# Patient Record
Sex: FEMALE | Race: OTHER | HISPANIC OR LATINO | URBAN - METROPOLITAN AREA
[De-identification: names, ages, dates, MRNs, and addresses within clinical notes are randomized per-mention and may not be internally consistent; named-entity substitution may affect disease eponyms.]

---

## 2017-01-26 ENCOUNTER — EMERGENCY (EMERGENCY)
Facility: HOSPITAL | Age: 27
LOS: 1 days | Discharge: HOME | End: 2017-01-26
Attending: EMERGENCY MEDICINE | Admitting: EMERGENCY MEDICINE
Payer: COMMERCIAL

## 2017-01-26 VITALS
WEIGHT: 162.04 LBS | HEIGHT: 61 IN | OXYGEN SATURATION: 97 % | DIASTOLIC BLOOD PRESSURE: 85 MMHG | TEMPERATURE: 99 F | RESPIRATION RATE: 18 BRPM | HEART RATE: 93 BPM | SYSTOLIC BLOOD PRESSURE: 138 MMHG

## 2017-01-26 VITALS
DIASTOLIC BLOOD PRESSURE: 78 MMHG | RESPIRATION RATE: 18 BRPM | TEMPERATURE: 99 F | OXYGEN SATURATION: 100 % | SYSTOLIC BLOOD PRESSURE: 150 MMHG | HEART RATE: 90 BPM

## 2017-01-26 DIAGNOSIS — N61.1 ABSCESS OF THE BREAST AND NIPPLE: ICD-10-CM

## 2017-01-26 DIAGNOSIS — Z79.2 LONG TERM (CURRENT) USE OF ANTIBIOTICS: ICD-10-CM

## 2017-01-26 DIAGNOSIS — N64.4 MASTODYNIA: ICD-10-CM

## 2017-01-26 DIAGNOSIS — Z79.891 LONG TERM (CURRENT) USE OF OPIATE ANALGESIC: ICD-10-CM

## 2017-01-26 LAB
ALBUMIN SERPL ELPH-MCNC: 3.3 G/DL — LOW (ref 3.4–5)
ALP SERPL-CCNC: 58 U/L — SIGNIFICANT CHANGE UP (ref 40–120)
ALT FLD-CCNC: 16 U/L — SIGNIFICANT CHANGE UP (ref 12–42)
ANION GAP SERPL CALC-SCNC: 7 MMOL/L — LOW (ref 9–16)
AST SERPL-CCNC: 12 U/L — LOW (ref 15–37)
BASOPHILS NFR BLD AUTO: 0.2 % — SIGNIFICANT CHANGE UP (ref 0–2)
BILIRUB SERPL-MCNC: 0.4 MG/DL — SIGNIFICANT CHANGE UP (ref 0.2–1.2)
BUN SERPL-MCNC: 8 MG/DL — SIGNIFICANT CHANGE UP (ref 7–23)
CALCIUM SERPL-MCNC: 8.7 MG/DL — SIGNIFICANT CHANGE UP (ref 8.5–10.5)
CHLORIDE SERPL-SCNC: 105 MMOL/L — SIGNIFICANT CHANGE UP (ref 96–108)
CO2 SERPL-SCNC: 26 MMOL/L — SIGNIFICANT CHANGE UP (ref 22–31)
CREAT SERPL-MCNC: 0.49 MG/DL — LOW (ref 0.5–1.3)
EOSINOPHIL NFR BLD AUTO: 0.5 % — SIGNIFICANT CHANGE UP (ref 0–6)
GLUCOSE SERPL-MCNC: 86 MG/DL — SIGNIFICANT CHANGE UP (ref 70–99)
HCT VFR BLD CALC: 36.4 % — SIGNIFICANT CHANGE UP (ref 34.5–45)
HGB BLD-MCNC: 12.1 G/DL — SIGNIFICANT CHANGE UP (ref 11.5–15.5)
LYMPHOCYTES # BLD AUTO: 8.3 % — LOW (ref 13–44)
MCHC RBC-ENTMCNC: 32.2 PG — SIGNIFICANT CHANGE UP (ref 27–34)
MCHC RBC-ENTMCNC: 33.2 G/DL — SIGNIFICANT CHANGE UP (ref 32–36)
MCV RBC AUTO: 96.8 FL — SIGNIFICANT CHANGE UP (ref 80–100)
MONOCYTES NFR BLD AUTO: 5 % — SIGNIFICANT CHANGE UP (ref 2–14)
NEUTROPHILS NFR BLD AUTO: 86 % — HIGH (ref 43–77)
PLATELET # BLD AUTO: 245 K/UL — SIGNIFICANT CHANGE UP (ref 150–400)
POTASSIUM SERPL-MCNC: 4.4 MMOL/L — SIGNIFICANT CHANGE UP (ref 3.5–5.3)
POTASSIUM SERPL-SCNC: 4.4 MMOL/L — SIGNIFICANT CHANGE UP (ref 3.5–5.3)
PROT SERPL-MCNC: 7.2 G/DL — SIGNIFICANT CHANGE UP (ref 6.4–8.2)
RBC # BLD: 3.76 M/UL — LOW (ref 3.8–5.2)
RBC # FLD: 12.5 % — SIGNIFICANT CHANGE UP (ref 10.3–16.9)
SODIUM SERPL-SCNC: 138 MMOL/L — SIGNIFICANT CHANGE UP (ref 135–145)
WBC # BLD: 12.8 K/UL — HIGH (ref 3.8–10.5)
WBC # FLD AUTO: 12.8 K/UL — HIGH (ref 3.8–10.5)

## 2017-01-26 PROCEDURE — 99284 EMERGENCY DEPT VISIT MOD MDM: CPT | Mod: 25

## 2017-01-26 PROCEDURE — 87040 BLOOD CULTURE FOR BACTERIA: CPT

## 2017-01-26 PROCEDURE — 87075 CULTR BACTERIA EXCEPT BLOOD: CPT

## 2017-01-26 PROCEDURE — 85025 COMPLETE CBC W/AUTO DIFF WBC: CPT

## 2017-01-26 PROCEDURE — 76942 ECHO GUIDE FOR BIOPSY: CPT

## 2017-01-26 PROCEDURE — 96374 THER/PROPH/DIAG INJ IV PUSH: CPT

## 2017-01-26 PROCEDURE — 76942 ECHO GUIDE FOR BIOPSY: CPT | Mod: 26,59

## 2017-01-26 PROCEDURE — 10022: CPT

## 2017-01-26 PROCEDURE — 87205 SMEAR GRAM STAIN: CPT

## 2017-01-26 PROCEDURE — 76641 ULTRASOUND BREAST COMPLETE: CPT

## 2017-01-26 PROCEDURE — 80053 COMPREHEN METABOLIC PANEL: CPT

## 2017-01-26 PROCEDURE — 96375 TX/PRO/DX INJ NEW DRUG ADDON: CPT

## 2017-01-26 PROCEDURE — 76642 ULTRASOUND BREAST LIMITED: CPT | Mod: 26,RT

## 2017-01-26 PROCEDURE — 99285 EMERGENCY DEPT VISIT HI MDM: CPT

## 2017-01-26 PROCEDURE — 87070 CULTURE OTHR SPECIMN AEROBIC: CPT

## 2017-01-26 RX ORDER — MORPHINE SULFATE 50 MG/1
4 CAPSULE, EXTENDED RELEASE ORAL ONCE
Qty: 0 | Refills: 0 | Status: DISCONTINUED | OUTPATIENT
Start: 2017-01-26 | End: 2017-01-26

## 2017-01-26 RX ADMIN — MORPHINE SULFATE 4 MILLIGRAM(S): 50 CAPSULE, EXTENDED RELEASE ORAL at 11:43

## 2017-01-26 RX ADMIN — MORPHINE SULFATE 4 MILLIGRAM(S): 50 CAPSULE, EXTENDED RELEASE ORAL at 12:00

## 2017-01-26 RX ADMIN — Medication 100 MILLIGRAM(S): at 13:01

## 2017-01-26 NOTE — CONSULT NOTE ADULT - SUBJECTIVE AND OBJECTIVE BOX
HPI: 25 yo female smoker with recurrent abscesses on breasts and groins who presents with recurrent right breast abscess. The patient reports she had an I&D at the same location back in August, but was never able to find time to follow up with Dr. Sparks. The patient reports the redness began 4 days ago and 2 days ago it became more fluctuant. She denies fevers or chills. No drainage.       PAST MEDICAL & SURGICAL HISTORY:  No pertinent past medical history  No significant past surgical history        Allergies    No Known Allergies    Intolerances        SOCIAL HISTORY:    FAMILY HISTORY:      Vital Signs Last 24 Hrs  T(C): 37.3, Max: 37.3 (01-26 @ 15:38)  T(F): 99.2, Max: 99.2 (01-26 @ 15:38)  HR: 90 (79 - 93)  BP: 150/78 (117/75 - 150/78)  BP(mean): --  RR: 18 (18 - 18)  SpO2: 100% (97% - 100%)    PHYSICAL EXAM:  General: Patient in bed in mild distress 2/2 right breast discomfort  abd: Soft, non-distended, non-tender  left breast: No erythema, no tenderness, no discharge  Right breast: 6 oclock breast fluctuant area with erythema and tenderness, needle aspiration site,         LABS:                        12.1   12.8  )-----------( 245      ( 26 Jan 2017 11:52 )             36.4     26 Jan 2017 11:52    138    |  105    |  8      ----------------------------<  86     4.4     |  26     |  0.49     Ca    8.7        26 Jan 2017 11:52    TPro  7.2    /  Alb  3.3    /  TBili  0.4    /  DBili  x      /  AST  12     /  ALT  16     /  AlkPhos  58     26 Jan 2017 11:52          RADIOLOGY & ADDITIONAL STUDIES:

## 2017-01-26 NOTE — ED ADULT NURSE NOTE - OBJECTIVE STATEMENT
Patient presents to ED with c/o abscess to right breast x4 days. Denies any fever or chills. Unable to assess abscess at present time, patient reports "it hurts too much to lift it." Requesting to wait until seen by MD/PA. No s/s acute distress noted. Will continue to monitor.

## 2017-01-26 NOTE — ED PROVIDER NOTE - MEDICAL DECISION MAKING DETAILS
27 yo female with right breast abscess. h/o similar in 08/2016, had breast US, FNA and surgical I&D done at that time. pending labs and US, surgical consult.

## 2017-01-26 NOTE — ED PROVIDER NOTE - SKIN, MLM
right breast localized erythema+, edema+, tenderness++, increased warmth to touch right breast  inner lower quadrant localized erythema+, edema+, tenderness++, increased warmth to touch

## 2017-01-26 NOTE — CONSULT NOTE ADULT - ASSESSMENT
25 yo female with Right breast abscess. Discussed with patient that it would need to be drained further and the possibility of needing IV abx, however, patient decided to leave AMA because of  needs. Discussed the importance of prompt follow up and recommended to stay and allow I&D however patient refused.

## 2017-01-26 NOTE — ED ADULT NURSE NOTE - EXPLANATION OF PATIENT'S REASON FOR LEAVING
patient states "I need to go  my son." Patient educated to return to ED tomorrow for IV antibiotics and drainage. Educated to return to ED sooner if symptoms worsen or develops high fevers

## 2017-01-26 NOTE — ED PROVIDER NOTE - OBJECTIVE STATEMENT
27 yo female s/p elective TOP yesterday in the Er with right breast pain, swelling. Pt reports that about 4 days ago she noted that her breast is painful. pt had right breast abscess drainage done 6 month ago. She is concerned that its similar abscess as its in the same location,  and its increased in size and became very painful over the past 4 days. Denies fever, chills, denies any discharge from the nipples. 27 yo female s/p elective TOP yesterday in the Er with right breast pain, swelling. Pt reports that about 4 days ago she noted that her breast is painful. pt had right breast abscess drainage done 6 month ago. She is concerned that its similar abscess as its in the same location,  and its increased in size and became very painful over the past 4 days. Denies fever, chills, denies any discharge from her nipples.

## 2017-01-26 NOTE — ED PROVIDER NOTE - ATTENDING CONTRIBUTION TO CARE
right breast abscess/cellulitis since yesterday. no fever.  area red/warm/tender.  started antibiotic

## 2017-01-26 NOTE — ED PROVIDER NOTE - PROGRESS NOTE DETAILS
pt had breast US and FNA done, findings consistent with abscess. Pt seen by surgical resident on call. admission for IV abx recommended. Refused as she has 3 yo child  to take care off. Pt will sign AMA . reports that she understands the importance of proper  treatment and will be back tomorrow to be admitted.

## 2017-01-31 LAB
CULTURE RESULTS: SIGNIFICANT CHANGE UP
CULTURE RESULTS: SIGNIFICANT CHANGE UP
SPECIMEN SOURCE: SIGNIFICANT CHANGE UP
SPECIMEN SOURCE: SIGNIFICANT CHANGE UP

## 2017-03-29 ENCOUNTER — APPOINTMENT (OUTPATIENT)
Dept: INTERNAL MEDICINE | Facility: CLINIC | Age: 27
End: 2017-03-29

## 2019-10-11 ENCOUNTER — EMERGENCY (EMERGENCY)
Facility: HOSPITAL | Age: 29
LOS: 1 days | Discharge: HOME | End: 2019-10-11
Admitting: EMERGENCY MEDICINE
Payer: MEDICAID

## 2019-10-11 VITALS
RESPIRATION RATE: 16 BRPM | SYSTOLIC BLOOD PRESSURE: 114 MMHG | WEIGHT: 166.89 LBS | HEIGHT: 61 IN | TEMPERATURE: 99 F | OXYGEN SATURATION: 98 % | DIASTOLIC BLOOD PRESSURE: 73 MMHG | HEART RATE: 85 BPM

## 2019-10-11 DIAGNOSIS — N76.4 ABSCESS OF VULVA: ICD-10-CM

## 2019-10-11 PROCEDURE — 56405 I&D VULVA/PERINEAL ABSCESS: CPT

## 2019-10-11 PROCEDURE — 99283 EMERGENCY DEPT VISIT LOW MDM: CPT | Mod: 25

## 2019-10-11 RX ORDER — IBUPROFEN 200 MG
600 TABLET ORAL ONCE
Refills: 0 | Status: COMPLETED | OUTPATIENT
Start: 2019-10-11 | End: 2019-10-11

## 2019-10-11 RX ORDER — ACETAMINOPHEN 500 MG
650 TABLET ORAL ONCE
Refills: 0 | Status: COMPLETED | OUTPATIENT
Start: 2019-10-11 | End: 2019-10-11

## 2019-10-11 RX ORDER — IBUPROFEN 200 MG
1 TABLET ORAL
Qty: 9 | Refills: 0
Start: 2019-10-11 | End: 2019-10-13

## 2019-10-11 RX ADMIN — Medication 650 MILLIGRAM(S): at 12:48

## 2019-10-11 RX ADMIN — Medication 1 TABLET(S): at 12:49

## 2019-10-11 RX ADMIN — Medication 600 MILLIGRAM(S): at 12:49

## 2019-10-11 NOTE — ED PROVIDER NOTE - GENITOURINARY, MLM
R sided labial/groin 2 cm fluctuant abscess with induration, swelling, tenderness, and mild erythema, no lymphangitis.

## 2019-10-11 NOTE — ED PROVIDER NOTE - NSFOLLOWUPINSTRUCTIONS_ED_ALL_ED_FT
Avoid getting it wet for two days. Return to ED in two days for wound check             ABSCESS - AfterCare(R) Instructions(ER/ED)     Abscess    WHAT YOU NEED TO KNOW:    A warm compress may help your abscess drain. Your healthcare provider may make a cut in the abscess so it can drain. You may need surgery to remove an abscess that is on your hands or buttocks.     DISCHARGE INSTRUCTIONS:    Return to the emergency department if:     The area around your abscess becomes very painful, warm, or has red streaks.      You have a fever and chills.      Your heart is beating faster than usual.       You feel faint or confused.    Contact your healthcare provider if:     Your abscess gets bigger or does not get better.      Your abscess returns.      You have questions or concerns about your condition or care.    Medicines: You may need any of the following:     Antibiotics help treat a bacterial infection.       Acetaminophen decreases pain and fever. It is available without a doctor's order. Ask how much to take and how often to take it. Follow directions. Acetaminophen can cause liver damage if not taken correctly.      NSAIDs, such as ibuprofen, help decrease swelling, pain, and fever. This medicine is available with or without a doctor's order. NSAIDs can cause stomach bleeding or kidney problems in certain people. If you take blood thinner medicine, always ask your healthcare provider if NSAIDs are safe for you. Always read the medicine label and follow directions.      Take your medicine as directed. Contact your healthcare provider if you think your medicine is not helping or if you have side effects. Tell him or her if you are allergic to any medicine. Keep a list of the medicines, vitamins, and herbs you take. Include the amounts, and when and why you take them. Bring the list or the pill bottles to follow-up visits. Carry your medicine list with you in case of an emergency.    Self-care:     Apply a warm compress to your abscess. This will help it open and drain. Wet a washcloth in warm, but not hot, water. Apply the compress for 10 minutes. Repeat this 4 times each day. Do not press on an abscess or try to open it with a needle. You may push the bacteria deeper or into your blood.       Do not share your clothes, towels, or sheets with anyone. This can spread the infection to others.       Wash your hands often. This can help prevent the spread of germs. Use soap and water or an alcohol-based hand rub.     Care for your wound after it is drained:     Care for your wound as directed. If your healthcare provider says it is okay, carefully remove the bandage and gauze packing. You may need to soak the gauze to get it out of your wound. Clean your wound and the area around it as directed. Dry the area and put on new, clean bandages. Change your bandages when they get wet or dirty.       Ask your healthcare provider how to change the gauze in your wound. Keep track of how many pieces of gauze are placed inside the wound. Do not put too much packing in the wound. Do not pack the gauze too tightly in your wound.     Follow up with your healthcare provider in 1 to 3 days: You may need to have your packing removed or your bandage changed. Write down your questions so you remember to ask them during your visits.        © Copyright [employer] 2019 All illustrations and images included in CareNotes are the copyrighted property of [email or web address] or [employer].      back to top                      © Copyright [employer] 2019

## 2019-10-11 NOTE — ED PROVIDER NOTE - PATIENT PORTAL LINK FT
You can access the FollowMyHealth Patient Portal offered by [facility] by registering at the following website: [email or web address]. By joining [employer]’s FollowMyHealth portal, you will also be able to view your health information using other applications (apps) compatible with our system.

## 2019-10-11 NOTE — ED ADULT NURSE NOTE - OBJECTIVE STATEMENT
pt has an abscess lateral to right groin , started about 2 days ago ,getting worse , more painful ,abscess with surrounding redness, denies fever, slight chills

## 2019-10-11 NOTE — ED PROVIDER NOTE - CLINICAL SUMMARY MEDICAL DECISION MAKING FREE TEXT BOX
Patient with right labial abscess was I&D. No complications. Recommend abx therapy and wound check in 2 days.

## 2019-10-11 NOTE — ED PROVIDER NOTE - OBJECTIVE STATEMENT
30 y/o F with PMHx oral substnce dependency, now in remission, presents to the ED complaining of R labial/groin abscess for the last 2 days, progressively with worsening swelling and tenderness. Pt admits abscess began as infected hair follicle from shaving. Denies fever, drainage, abdominal pain or numbness/tingling. 30 y/o F with PMHx oral substance dependency, now in remission, presents to the ED complaining of R labial/groin abscess for the last 2 days, progressively with worsening swelling and tenderness. Pt admits abscess began as infected hair follicle from shaving. Denies fever, drainage, abdominal pain or numbness/tingling.

## 2021-07-19 ENCOUNTER — APPOINTMENT (OUTPATIENT)
Dept: INTERNAL MEDICINE | Facility: CLINIC | Age: 31
End: 2021-07-19

## 2021-07-22 ENCOUNTER — APPOINTMENT (OUTPATIENT)
Dept: INTERNAL MEDICINE | Facility: CLINIC | Age: 31
End: 2021-07-22

## 2022-05-23 NOTE — ED PROVIDER NOTE - NS HIV RISK FACTOR YES
Declined
You can access the FollowMyHealth Patient Portal offered by [facility] by registering at the following website: [email or web address]. By joining [employer]’s FollowMyHealth portal, you will also be able to view your health information using other applications (apps) compatible with our system.

## 2022-10-27 ENCOUNTER — APPOINTMENT (OUTPATIENT)
Dept: INTERNAL MEDICINE | Facility: CLINIC | Age: 32
End: 2022-10-27

## 2022-10-31 ENCOUNTER — APPOINTMENT (OUTPATIENT)
Dept: INTERNAL MEDICINE | Facility: CLINIC | Age: 32
End: 2022-10-31

## 2022-10-31 ENCOUNTER — RESULT CHARGE (OUTPATIENT)
Age: 32
End: 2022-10-31

## 2022-11-01 ENCOUNTER — APPOINTMENT (OUTPATIENT)
Dept: INTERNAL MEDICINE | Facility: CLINIC | Age: 32
End: 2022-11-01

## 2022-11-01 ENCOUNTER — NON-APPOINTMENT (OUTPATIENT)
Age: 32
End: 2022-11-01

## 2022-11-01 VITALS
WEIGHT: 219 LBS | OXYGEN SATURATION: 97 % | HEART RATE: 92 BPM | SYSTOLIC BLOOD PRESSURE: 112 MMHG | TEMPERATURE: 97.4 F | HEIGHT: 61 IN | DIASTOLIC BLOOD PRESSURE: 66 MMHG | BODY MASS INDEX: 41.35 KG/M2

## 2022-11-01 DIAGNOSIS — M54.9 DORSALGIA, UNSPECIFIED: ICD-10-CM

## 2022-11-01 DIAGNOSIS — F19.10 OTHER PSYCHOACTIVE SUBSTANCE ABUSE, UNCOMPLICATED: ICD-10-CM

## 2022-11-01 PROCEDURE — 99204 OFFICE O/P NEW MOD 45 MIN: CPT | Mod: GC,25

## 2022-11-01 PROCEDURE — 36415 COLL VENOUS BLD VENIPUNCTURE: CPT

## 2022-11-01 PROCEDURE — 93000 ELECTROCARDIOGRAM COMPLETE: CPT

## 2022-11-02 PROBLEM — M54.9 BACK PAIN, ACUTE: Status: ACTIVE | Noted: 2022-11-01

## 2022-11-03 LAB
ALBUMIN SERPL ELPH-MCNC: 3.8 G/DL
ALP BLD-CCNC: 92 U/L
ALT SERPL-CCNC: 19 U/L
ANION GAP SERPL CALC-SCNC: 11 MMOL/L
APPEARANCE: CLEAR
AST SERPL-CCNC: 21 U/L
BILIRUB SERPL-MCNC: 0.3 MG/DL
BILIRUBIN URINE: NEGATIVE
BLOOD URINE: NEGATIVE
BUN SERPL-MCNC: 9 MG/DL
CALCIUM SERPL-MCNC: 9.2 MG/DL
CHLORIDE SERPL-SCNC: 101 MMOL/L
CHOLEST SERPL-MCNC: 129 MG/DL
CO2 SERPL-SCNC: 25 MMOL/L
COLOR: YELLOW
CREAT SERPL-MCNC: 0.58 MG/DL
EGFR: 122 ML/MIN/1.73M2
GLUCOSE QUALITATIVE U: NEGATIVE
GLUCOSE SERPL-MCNC: 103 MG/DL
HDLC SERPL-MCNC: 33 MG/DL
KETONES URINE: NEGATIVE
LDLC SERPL CALC-MCNC: 81 MG/DL
LEUKOCYTE ESTERASE URINE: NEGATIVE
NITRITE URINE: NEGATIVE
NONHDLC SERPL-MCNC: 96 MG/DL
NT-PROBNP SERPL-MCNC: 18 PG/ML
PH URINE: 6.5
POTASSIUM SERPL-SCNC: 4 MMOL/L
PROT SERPL-MCNC: 6.5 G/DL
PROTEIN URINE: NEGATIVE
SODIUM SERPL-SCNC: 138 MMOL/L
SPECIFIC GRAVITY URINE: 1.02
TRIGL SERPL-MCNC: 71 MG/DL
TSH SERPL-ACNC: 1.63 UIU/ML
UROBILINOGEN URINE: ABNORMAL

## 2022-11-16 NOTE — ED ADULT NURSE NOTE - PAIN RATING/NUMBER SCALE (0-10): REST
10 Gabapentin Counseling: I discussed with the patient the risks of gabapentin including but not limited to dizziness, somnolence, fatigue and ataxia.

## 2022-12-08 DIAGNOSIS — Z13.220 ENCOUNTER FOR SCREENING FOR LIPOID DISORDERS: ICD-10-CM

## 2022-12-22 ENCOUNTER — APPOINTMENT (OUTPATIENT)
Dept: INTERNAL MEDICINE | Facility: CLINIC | Age: 32
End: 2022-12-22

## 2022-12-29 ENCOUNTER — APPOINTMENT (OUTPATIENT)
Dept: INTERNAL MEDICINE | Facility: CLINIC | Age: 32
End: 2022-12-29

## 2022-12-30 ENCOUNTER — APPOINTMENT (OUTPATIENT)
Dept: INTERNAL MEDICINE | Facility: CLINIC | Age: 32
End: 2022-12-30

## 2023-03-28 ENCOUNTER — APPOINTMENT (OUTPATIENT)
Dept: INTERNAL MEDICINE | Facility: CLINIC | Age: 33
End: 2023-03-28

## 2023-06-05 ENCOUNTER — APPOINTMENT (OUTPATIENT)
Dept: INTERNAL MEDICINE | Facility: CLINIC | Age: 33
End: 2023-06-05
Payer: MEDICAID

## 2023-06-05 ENCOUNTER — RESULT REVIEW (OUTPATIENT)
Age: 33
End: 2023-06-05

## 2023-06-05 VITALS
WEIGHT: 200 LBS | DIASTOLIC BLOOD PRESSURE: 75 MMHG | SYSTOLIC BLOOD PRESSURE: 112 MMHG | HEART RATE: 93 BPM | BODY MASS INDEX: 37.76 KG/M2 | OXYGEN SATURATION: 100 % | HEIGHT: 61 IN | TEMPERATURE: 97.1 F

## 2023-06-05 PROCEDURE — 99214 OFFICE O/P EST MOD 30 MIN: CPT | Mod: GC

## 2023-06-09 ENCOUNTER — TRANSCRIPTION ENCOUNTER (OUTPATIENT)
Age: 33
End: 2023-06-09

## 2023-06-11 ENCOUNTER — OUTPATIENT (OUTPATIENT)
Dept: OUTPATIENT SERVICES | Facility: HOSPITAL | Age: 33
LOS: 1 days | End: 2023-06-11
Payer: COMMERCIAL

## 2023-06-11 PROCEDURE — 73562 X-RAY EXAM OF KNEE 3: CPT

## 2023-06-11 PROCEDURE — 73562 X-RAY EXAM OF KNEE 3: CPT | Mod: 26,LT,RT

## 2023-06-16 ENCOUNTER — TRANSCRIPTION ENCOUNTER (OUTPATIENT)
Age: 33
End: 2023-06-16

## 2023-06-16 ENCOUNTER — APPOINTMENT (OUTPATIENT)
Dept: INTERNAL MEDICINE | Facility: CLINIC | Age: 33
End: 2023-06-16

## 2023-06-20 ENCOUNTER — RESULT REVIEW (OUTPATIENT)
Age: 33
End: 2023-06-20

## 2023-06-20 ENCOUNTER — LABORATORY RESULT (OUTPATIENT)
Age: 33
End: 2023-06-20

## 2023-06-20 ENCOUNTER — OUTPATIENT (OUTPATIENT)
Dept: OUTPATIENT SERVICES | Facility: HOSPITAL | Age: 33
LOS: 1 days | End: 2023-06-20
Payer: COMMERCIAL

## 2023-06-20 ENCOUNTER — APPOINTMENT (OUTPATIENT)
Dept: ORTHOPEDIC SURGERY | Facility: CLINIC | Age: 33
End: 2023-06-20
Payer: MEDICAID

## 2023-06-20 DIAGNOSIS — M25.569 PAIN IN UNSPECIFIED KNEE: ICD-10-CM

## 2023-06-20 PROCEDURE — 20610 DRAIN/INJ JOINT/BURSA W/O US: CPT | Mod: 50

## 2023-06-20 PROCEDURE — 73564 X-RAY EXAM KNEE 4 OR MORE: CPT

## 2023-06-20 PROCEDURE — 99204 OFFICE O/P NEW MOD 45 MIN: CPT | Mod: 25

## 2023-06-20 PROCEDURE — 72170 X-RAY EXAM OF PELVIS: CPT | Mod: 26

## 2023-06-20 PROCEDURE — 72170 X-RAY EXAM OF PELVIS: CPT

## 2023-06-20 PROCEDURE — 73564 X-RAY EXAM KNEE 4 OR MORE: CPT | Mod: 26,LT,RT

## 2023-06-20 NOTE — REVIEW OF SYSTEMS
[Joint Pain] : joint pain [Joint Stiffness] : joint stiffness [Joint Swelling] : joint swelling [Feeling Tired] : fatigue [Urinary Urgency] : urinary urgency [Anxiety] : anxiety [Depression] : depression [Sleep Disturbances] : ~T sleep disturbances [Easy Bruising] : a tendency for easy bruising [Negative] : Heme/Lymph

## 2023-06-21 ENCOUNTER — NON-APPOINTMENT (OUTPATIENT)
Age: 33
End: 2023-06-21

## 2023-06-22 LAB
B PERT IGG+IGM PNL SER: CLEAR
B PERT IGG+IGM PNL SER: CLEAR
COLOR FLD: YELLOW
COLOR FLD: YELLOW
FLUID INTAKE SUBSTANCE CLASS: NORMAL
FLUID INTAKE SUBSTANCE CLASS: NORMAL
LYMPHOCYTES # FLD MANUAL: 28 %
LYMPHOCYTES # FLD MANUAL: 45 %
MONOS+MACROS NFR FLD MANUAL: 47 %
MONOS+MACROS NFR FLD MANUAL: 53 %
NEUTS SEG # FLD MANUAL: 19 %
NEUTS SEG # FLD MANUAL: 8 %
RBC # FLD MANUAL: < 2000 /UL
RBC # FLD MANUAL: < 2000 /UL
SYCRY CLARITY: CLEAR
SYCRY COLOR: YELLOW
SYCRY ID: NORMAL
SYCRY TUBE: NORMAL
TOTAL CELLS COUNTED FLD: 518 /UL
TOTAL CELLS COUNTED FLD: 587 /UL
TUBE TYPE: NORMAL
TUBE TYPE: NORMAL

## 2023-06-22 NOTE — PROCEDURE
[de-identified] : Procedure: Knee joint aspiration/injection\par Laterality: right \par Indication: Osteoarthritis - discussed with patient\par Skin prep: alcohol and chlorhexidine\par Anesthesia: ethyl chloride spray\par Needle: 18-gauge\par Portal: superolateral\par Aspiration: 30 cc of normal appearing synovial fluid\par Injectate: 2cc of 2% lidocaine, 2cc of 0.5% bupivacaine, and 1cc of 40mg/mL triamcinolone\par Dressing: Band-aid\par Complications: None \par \par Procedure: Knee joint aspiration/injection\par Laterality: left \par Indication: Osteoarthritis - discussed with patient\par Skin prep: alcohol and chlorhexidine\par Anesthesia: ethyl chloride spray\par Needle: 18-gauge\par Portal: superolateral\par Aspiration: 25 cc of normal appearing synovial fluid\par Injectate: 2cc of 2% lidocaine, 2cc of 0.5% bupivacaine, and 1cc of 40mg/mL triamcinolone\par Dressing: Band-aid\par Complications: None

## 2023-06-22 NOTE — ADDENDUM
[FreeTextEntry1] : Documented by Nikky Valentin acting as a scribe for Dr. Marlon Hogan on 06/20/2023.

## 2023-06-22 NOTE — DISCUSSION/SUMMARY
[de-identified] : 31 y/o female with mild b/l knee OA and possible inflammatory arthropathy. \par - I informed her that the degree of pain and disability that she is suffering does not seem consistent with her level of radiographic OA, so I suspect that she may have some component of inflammatory arthropathy vs. referred  or otherwise nonphysiologic pain. She is not a candidate for arthroplasty in my opinion given the lack of severe radiographic arthritis and her young age. \par - I recommended we begin with conservative management including PT and HEP and Tylenol and meloxicam as needed in addition to her gabapentin. \par - I performed b/l knee aspirations and CSI today. I will f/u with her with the results of the aspiration once available with further recommendations. If evidence of crystalline or other inflammatory disease is found, then she would need a referral to rheumatology. \par - She will RTC in 3 months with no new XR needed to consider repeat CSI. I also recommended that she do her best to modulate her weight via diet and exercise. \par - Depending on her response to the above treatment measures, we may consider other b/l knee MRIs +/- lumbar spine MRI at the next visit.

## 2023-06-22 NOTE — HISTORY OF PRESENT ILLNESS
[5] : a current pain level of 5/10 [Constant] : ~He/She~ states the symptoms seem to be constant [Bending] : worsened by bending [Walking] : worsened by walking [Knee Flexion] : worsened with knee flexion [Knee Extension] : worsened with knee extension [NSAIDs] : relieved by nonsteroidal anti-inflammatory drugs [Rest] : relieved by rest [de-identified] : 06/20/2023: 31 y/o female referred by Dr. Salmon for evaluation of b/l knee pain. She describes left greater than right knee pain that has been persistent for over 1 year without history of injury or other inciting event. She denotes anterior knee pain b/l. There is a lesser focus of suprapatellar pain and a more severe focus of infrapatellar pain, more focused at the lateral fat pad on both knees, exacerbated by extended ambulation and knee flexion as such as sitting, squatting, and kneeling. She has not had any treatment thus far aside from gabapentin which she finds to be ineffective. She has been using various assistive devices such as a cane, walker, and her infant daughter's stroller. She finds that her ambulatory tolerance is limited to less than 0.5 block without an assistive device which she relies on increasingly for support.\par \par PMHx includes prior Percocet addiction, currently on Suboxone, as well as depression, anxiety, and 0.5 pack per day smoking status which is ongoing. She has no relevant medical allergies.  [de-identified] : pt states pain is sharp , achy , burning , and stabbing

## 2023-06-22 NOTE — PHYSICAL EXAM
[de-identified] :  General appearance: well nourished and hydrated, pleasant, alert and oriented x 3, cooperative.  \par HEENT: normocephalic, EOM intact, wearing mask, external auditory canal clear.  \par Cardiovascular: no lower leg edema, no varicosities, dorsalis pedis pulses palpable and symmetric.  \par Lymphatics: no palpable lymphadenopathy, bilateral symmetric lower leg lymphedema. No cellulitis or venous stasis changes. \par Neurologic: sensation is normal, no muscle weakness in upper or lower extremities, patella tendon reflexes present and symmetric.  \par Dermatologic: skin moist, warm, no rash.  \par Spine: cervical spine with normal lordosis and painless range of motion, thoracic spine with normal kyphosis and painless range of motion, lumbosacral spine with normal lordosis and painless range of motion.  No tenderness to palpation along midline spine and paraspinal musculature.  Sacroiliac joints nontender bilaterally. Negative SLR and crossed SLR tests bilaterally.\par Gait: no assistive device. She is markedly slow to stand and get started. She demonstrates a forward leaning posture from the lumbosacral junction and has a b/l antalgic gait pattern without specific antalgia. She has a slow shuffling cautious gait pattern and there is an approximately 20 second transition from standing to supine on the exam table. \par \par Left knee: She is verbalizing pain with every aspect of the examination.\par - Focal soft tissue swelling: none\par - Ecchymosis: none\par - Erythema: none\par - Effusion: large, no Baker's cyst\par - Wounds: none\par - Alignment: normal\par - Tenderness: circumferential TTP\par - ROM: 5-90\par - Collateral laxity: none\par - Cruciate laxity: none\par - Popliteal angle (degrees): 30\par - Quad strength: 2/5, very poor effort noted\par \par Right knee: She is verbalizing pain with every aspect of the examination\par - Focal soft tissue swelling: none\par - Ecchymosis: none\par - Erythema: none\par - Effusion: large, no Baker's cyst\par - Wounds: none\par - Alignment: normal\par - Tenderness: diffuse circumferential TTP, mild warmth over the knee\par - ROM: 0-90\par - Collateral laxity: none\par - Cruciate laxity: none\par - Popliteal angle (degrees): 30\par - Quad strength: 3+/5, very poor effort noted  [de-identified] : AP pelvis and 4 views of the bilateral knees (weightbearing AP, weightbearing Goff, weightbearing lateral, and Sunrise) were interpreted by me and reviewed with the patient.\par \par Location of imaging: [facility] \par Date of exam: 06/20/2023\par \par Pelvic alignment: normal\par \par Right hip -- \par Arthritis: none\par Deformity: cam\par Osteonecrosis: none\par \par Left hip -- \par Arthritis: none\par Deformity: cam\par Osteonecrosis: none\par \par Right knee -- \par Alignment: normal\par Arthritis: tricompartmental OA most pronounced medially, KL 2-3\par Patellar height: normal\par Patellar tracking: central\par \par Left knee -- \par Alignment: normal\par Arthritis: tricompartmental OA most pronounced medially, KL 2-3\par Patellar height: normal\par Patellar tracking: central

## 2023-06-22 NOTE — END OF VISIT
[FreeTextEntry3] : All medical record entries made by the Scribe were at my, Dr. Marlon Hogan, direction and personally dictated by me on 06/20/2023. I have reviewed the chart and agree that the record accurately reflects my personal performance of the history, physical exam, assessment and plan. I have also personally directed, reviewed, and agreed with the chart.

## 2023-06-23 ENCOUNTER — TRANSCRIPTION ENCOUNTER (OUTPATIENT)
Age: 33
End: 2023-06-23

## 2023-06-23 ENCOUNTER — APPOINTMENT (OUTPATIENT)
Dept: INTERNAL MEDICINE | Facility: CLINIC | Age: 33
End: 2023-06-23

## 2023-06-29 ENCOUNTER — RX RENEWAL (OUTPATIENT)
Age: 33
End: 2023-06-29

## 2023-07-11 LAB
BACTERIA FLD CULT: NORMAL
BACTERIA FLD CULT: NORMAL

## 2023-07-25 ENCOUNTER — APPOINTMENT (OUTPATIENT)
Dept: INTERNAL MEDICINE | Facility: CLINIC | Age: 33
End: 2023-07-25

## 2023-08-04 ENCOUNTER — APPOINTMENT (OUTPATIENT)
Dept: INTERNAL MEDICINE | Facility: CLINIC | Age: 33
End: 2023-08-04

## 2023-08-24 ENCOUNTER — APPOINTMENT (OUTPATIENT)
Dept: INTERNAL MEDICINE | Facility: CLINIC | Age: 33
End: 2023-08-24

## 2023-08-28 ENCOUNTER — APPOINTMENT (OUTPATIENT)
Dept: INTERNAL MEDICINE | Facility: CLINIC | Age: 33
End: 2023-08-28
Payer: MEDICAID

## 2023-08-28 ENCOUNTER — NON-APPOINTMENT (OUTPATIENT)
Age: 33
End: 2023-08-28

## 2023-08-28 VITALS
RESPIRATION RATE: 14 BRPM | DIASTOLIC BLOOD PRESSURE: 68 MMHG | OXYGEN SATURATION: 98 % | BODY MASS INDEX: 38.51 KG/M2 | TEMPERATURE: 97.5 F | HEART RATE: 88 BPM | HEIGHT: 61 IN | SYSTOLIC BLOOD PRESSURE: 104 MMHG | WEIGHT: 204 LBS

## 2023-08-28 DIAGNOSIS — R53.83 OTHER FATIGUE: ICD-10-CM

## 2023-08-28 PROCEDURE — 99214 OFFICE O/P EST MOD 30 MIN: CPT | Mod: 25

## 2023-08-28 RX ORDER — DICLOFENAC SODIUM 3 G/100G
3 GEL TOPICAL TWICE DAILY
Qty: 1 | Refills: 0 | Status: DISCONTINUED | COMMUNITY
Start: 2023-07-11 | End: 2023-08-28

## 2023-08-28 RX ORDER — DICLOFENAC SODIUM 1% 10 MG/G
1 GEL TOPICAL DAILY
Qty: 1 | Refills: 0 | Status: DISCONTINUED | COMMUNITY
Start: 2023-06-06 | End: 2023-08-28

## 2023-08-29 LAB
ANION GAP SERPL CALC-SCNC: 10 MMOL/L
BUN SERPL-MCNC: 9 MG/DL
CALCIUM SERPL-MCNC: 9.6 MG/DL
CHLORIDE SERPL-SCNC: 102 MMOL/L
CO2 SERPL-SCNC: 30 MMOL/L
CREAT SERPL-MCNC: 0.62 MG/DL
EGFR: 120 ML/MIN/1.73M2
ESTIMATED AVERAGE GLUCOSE: 111 MG/DL
GLUCOSE SERPL-MCNC: 89 MG/DL
HBA1C MFR BLD HPLC: 5.5 %
HCT VFR BLD CALC: 42 %
HGB BLD-MCNC: 12.6 G/DL
MCHC RBC-ENTMCNC: 30 GM/DL
MCHC RBC-ENTMCNC: 30.8 PG
MCV RBC AUTO: 102.7 FL
PLATELET # BLD AUTO: 323 K/UL
POTASSIUM SERPL-SCNC: 4.6 MMOL/L
RBC # BLD: 4.09 M/UL
RBC # FLD: 12 %
SODIUM SERPL-SCNC: 142 MMOL/L
TSH SERPL-ACNC: 1.26 UIU/ML
WBC # FLD AUTO: 8.61 K/UL

## 2023-08-29 NOTE — PHYSICAL EXAM
[Normal] : affect was normal and insight and judgment were intact [de-identified] : b/l LE +4/5 strength, ttp medial and lateral border of b/l knee, more pronounced in infrapatellar region. b/l UE +5/5.

## 2023-08-29 NOTE — REVIEW OF SYSTEMS
[Fatigue] : fatigue [Joint Pain] : joint pain [Joint Stiffness] : joint stiffness [Joint Swelling] : joint swelling [Muscle Weakness] : muscle weakness [Negative] : Heme/Lymph [Fever] : no fever [Chills] : no chills [Night Sweats] : no night sweats [Recent Change In Weight] : ~T no recent weight change [Muscle Pain] : no muscle pain [Back Pain] : no back pain

## 2023-08-29 NOTE — ASSESSMENT
[FreeTextEntry1] : Pt is 31 yo F w PMhx of oral substance abuse (oxycodone), now in remission on buprenorphine, anxiety, MDD, recent , L sided sciatica, and chronic b/l knee pain presenting to the clinic for persistent knee pain.  #Knee Pain - Imaging indicative of some degree of OA but not necesitating arthroplasty at this juncture given young age - Encouraged continued PT, had not gone yet. Referral placed. Continue with OTC analgesia, and meloxicam - F/u with ortho, Dr. Hogan - Will re-fill gabapentin 300mg TID.  #Depression - Continue with lexapro 15mg qd, will send refill - Advised to f/u w [facility]  #Health Maintenance - CBC, TSH, A1C, BMP today - PAP last year wnl

## 2023-08-29 NOTE — HEALTH RISK ASSESSMENT
[1] : 2) Feeling down, depressed, or hopeless for several days (1) [PHQ-2 Positive] : PHQ-2 Positive [[id number]] : 2

## 2023-08-29 NOTE — END OF VISIT
[] : Resident [FreeTextEntry3] : Patient seen with Dr. Ames.  33 y/o female with hx of OUD, treated at [facility] on buprenorphine, mood disorder (previously follwed at [facility], but has not been seen there recently after a change in therapist) here for f/u.  Agree with plan as noted above, encouraged participation in PT, re-engaging with [facility] (patient was not able to follow through with [facility] as noted in chart) for both therapy and psychiatric services (number provided to patient).  refills on medications provided in the interim, but clarified that this is a bridge plan, patient expressed understanding.

## 2023-08-29 NOTE — HISTORY OF PRESENT ILLNESS
[FreeTextEntry1] : Follow-up [de-identified] : Pt is 33 yo F w PMhx of oral substance abuse (oxycodone), now in remission on buprenorphine, anxiety, MDD, recent , L sided sciatica, and chronic b/l knee pain presenting to the clinic for persistent knee pain. Patient recently saw orthopedics, had b/l knee aspiration which revealed no infectious etiology or auto-immune concer. Plan for conservative management with meloxicam, OTC analgesia, and topical agents. B/L xrays with previous effusions which have been drained and moderate medical compartment joint space narrowing, and scant osteophytes. Patient reports persistent pain, although had relief after ortho drainage. Using meloxicam, tylenol, as well as advil for PRN use. Reports moderate depression, loss of appetite, interest. No SI/HI. Encouraged to follow-up w previous psychaitrist, at [facility]. Patient denies fever, chills, chest pain, palpitations, cough, SOB, wheeze, abdominal pain, nausea, vomiting, diarrhea, dysuria, hematuria, LE swelling, or new rash

## 2023-09-20 ENCOUNTER — APPOINTMENT (OUTPATIENT)
Dept: ORTHOPEDIC SURGERY | Facility: CLINIC | Age: 33
End: 2023-09-20
Payer: MEDICAID

## 2023-09-20 VITALS
HEART RATE: 80 BPM | WEIGHT: 204 LBS | SYSTOLIC BLOOD PRESSURE: 119 MMHG | BODY MASS INDEX: 38.51 KG/M2 | DIASTOLIC BLOOD PRESSURE: 68 MMHG | OXYGEN SATURATION: 99 % | HEIGHT: 61 IN

## 2023-09-20 DIAGNOSIS — M17.0 BILATERAL PRIMARY OSTEOARTHRITIS OF KNEE: ICD-10-CM

## 2023-09-20 PROCEDURE — 20610 DRAIN/INJ JOINT/BURSA W/O US: CPT | Mod: 50

## 2023-10-08 ENCOUNTER — RESULT REVIEW (OUTPATIENT)
Age: 33
End: 2023-10-08

## 2023-10-08 ENCOUNTER — APPOINTMENT (OUTPATIENT)
Dept: MRI IMAGING | Facility: HOSPITAL | Age: 33
End: 2023-10-08

## 2023-10-08 ENCOUNTER — NON-APPOINTMENT (OUTPATIENT)
Age: 33
End: 2023-10-08

## 2023-10-08 ENCOUNTER — OUTPATIENT (OUTPATIENT)
Dept: OUTPATIENT SERVICES | Facility: HOSPITAL | Age: 33
LOS: 1 days | End: 2023-10-08
Payer: COMMERCIAL

## 2023-10-08 PROCEDURE — 73721 MRI JNT OF LWR EXTRE W/O DYE: CPT

## 2023-10-08 PROCEDURE — 73721 MRI JNT OF LWR EXTRE W/O DYE: CPT | Mod: 26,LT,76

## 2023-10-08 PROCEDURE — 72148 MRI LUMBAR SPINE W/O DYE: CPT

## 2023-10-08 PROCEDURE — 72148 MRI LUMBAR SPINE W/O DYE: CPT | Mod: 26

## 2023-10-30 ENCOUNTER — NON-APPOINTMENT (OUTPATIENT)
Age: 33
End: 2023-10-30

## 2023-11-02 ENCOUNTER — APPOINTMENT (OUTPATIENT)
Dept: PHYSICAL MEDICINE AND REHAB | Facility: CLINIC | Age: 33
End: 2023-11-02
Payer: MEDICAID

## 2023-11-02 ENCOUNTER — LABORATORY RESULT (OUTPATIENT)
Age: 33
End: 2023-11-02

## 2023-11-02 ENCOUNTER — NON-APPOINTMENT (OUTPATIENT)
Age: 33
End: 2023-11-02

## 2023-11-02 VITALS
HEIGHT: 61 IN | SYSTOLIC BLOOD PRESSURE: 118 MMHG | DIASTOLIC BLOOD PRESSURE: 76 MMHG | BODY MASS INDEX: 35.12 KG/M2 | WEIGHT: 186 LBS | HEART RATE: 72 BPM

## 2023-11-02 DIAGNOSIS — F17.200 NICOTINE DEPENDENCE, UNSPECIFIED, UNCOMPLICATED: ICD-10-CM

## 2023-11-02 DIAGNOSIS — Z56.0 UNEMPLOYMENT, UNSPECIFIED: ICD-10-CM

## 2023-11-02 DIAGNOSIS — Z87.39 PERSONAL HISTORY OF OTHER DISEASES OF THE MUSCULOSKELETAL SYSTEM AND CONNECTIVE TISSUE: ICD-10-CM

## 2023-11-02 PROCEDURE — 99204 OFFICE O/P NEW MOD 45 MIN: CPT

## 2023-11-02 SDOH — ECONOMIC STABILITY - INCOME SECURITY: UNEMPLOYMENT, UNSPECIFIED: Z56.0

## 2023-11-03 ENCOUNTER — NON-APPOINTMENT (OUTPATIENT)
Age: 33
End: 2023-11-03

## 2023-11-03 LAB
CANCER AG125 SERPL-ACNC: 10 U/ML
CEA SERPL-MCNC: 3 NG/ML
URATE SERPL-MCNC: 3.6 MG/DL

## 2023-11-07 LAB
ALBUMIN SERPL ELPH-MCNC: 4.3 G/DL
ALBUPE: 29.8 %
ALP BLD-CCNC: 107 U/L
ALPHA1UPE: 25.3 %
ALPHA2UPE: 19.3 %
ALT SERPL-CCNC: 25 U/L
ANION GAP SERPL CALC-SCNC: 16 MMOL/L
AST SERPL-CCNC: 24 U/L
BETAUPE: 11 %
BILIRUB SERPL-MCNC: 0.2 MG/DL
BUN SERPL-MCNC: 9 MG/DL
CALCIUM SERPL-MCNC: 9.1 MG/DL
CHLORIDE SERPL-SCNC: 102 MMOL/L
CO2 SERPL-SCNC: 22 MMOL/L
CREAT SERPL-MCNC: 0.55 MG/DL
EGFR: 123 ML/MIN/1.73M2
GAMMAUPE: 14.6 %
GLUCOSE SERPL-MCNC: 80 MG/DL
IGA 24H UR QL IFE: NORMAL
KAPPA LC 24H UR QL: NORMAL
POTASSIUM SERPL-SCNC: 4.4 MMOL/L
PROT PATTERN 24H UR ELPH-IMP: NORMAL
PROT SERPL-MCNC: 6.8 G/DL
PROT UR-MCNC: 30 MG/DL
PROT UR-MCNC: 30 MG/DL
SODIUM SERPL-SCNC: 140 MMOL/L

## 2023-11-17 ENCOUNTER — APPOINTMENT (OUTPATIENT)
Dept: PHYSICAL MEDICINE AND REHAB | Facility: CLINIC | Age: 33
End: 2023-11-17

## 2023-11-27 ENCOUNTER — RESULT REVIEW (OUTPATIENT)
Age: 33
End: 2023-11-27

## 2023-12-01 ENCOUNTER — APPOINTMENT (OUTPATIENT)
Dept: CT IMAGING | Facility: HOSPITAL | Age: 33
End: 2023-12-01

## 2023-12-01 ENCOUNTER — OUTPATIENT (OUTPATIENT)
Dept: OUTPATIENT SERVICES | Facility: HOSPITAL | Age: 33
LOS: 1 days | End: 2023-12-01
Payer: COMMERCIAL

## 2023-12-01 ENCOUNTER — APPOINTMENT (OUTPATIENT)
Dept: MRI IMAGING | Facility: HOSPITAL | Age: 33
End: 2023-12-01

## 2023-12-01 PROCEDURE — 72195 MRI PELVIS W/O DYE: CPT | Mod: 26

## 2023-12-01 PROCEDURE — 72195 MRI PELVIS W/O DYE: CPT

## 2023-12-07 ENCOUNTER — APPOINTMENT (OUTPATIENT)
Dept: PHYSICAL MEDICINE AND REHAB | Facility: CLINIC | Age: 33
End: 2023-12-07
Payer: MEDICAID

## 2023-12-07 VITALS
OXYGEN SATURATION: 97 % | DIASTOLIC BLOOD PRESSURE: 70 MMHG | HEIGHT: 61 IN | BODY MASS INDEX: 35.12 KG/M2 | SYSTOLIC BLOOD PRESSURE: 114 MMHG | WEIGHT: 186 LBS | HEART RATE: 81 BPM

## 2023-12-07 DIAGNOSIS — N63.0 UNSPECIFIED LUMP IN UNSPECIFIED BREAST: ICD-10-CM

## 2023-12-07 DIAGNOSIS — R19.00 INTRA-ABDOMINAL AND PELVIC SWELLING, MASS AND LUMP, UNSPECIFIED SITE: ICD-10-CM

## 2023-12-07 DIAGNOSIS — R60.9 EDEMA, UNSPECIFIED: ICD-10-CM

## 2023-12-07 DIAGNOSIS — M54.42 LUMBAGO WITH SCIATICA, LEFT SIDE: ICD-10-CM

## 2023-12-07 PROCEDURE — 99214 OFFICE O/P EST MOD 30 MIN: CPT

## 2023-12-22 ENCOUNTER — APPOINTMENT (OUTPATIENT)
Dept: ORTHOPEDIC SURGERY | Facility: CLINIC | Age: 33
End: 2023-12-22

## 2024-01-04 ENCOUNTER — APPOINTMENT (OUTPATIENT)
Dept: PHYSICAL MEDICINE AND REHAB | Facility: CLINIC | Age: 34
End: 2024-01-04

## 2024-02-28 ENCOUNTER — APPOINTMENT (OUTPATIENT)
Dept: ORTHOPEDIC SURGERY | Facility: CLINIC | Age: 34
End: 2024-02-28

## 2024-02-28 ENCOUNTER — RX RENEWAL (OUTPATIENT)
Age: 34
End: 2024-02-28

## 2024-02-28 RX ORDER — PREGABALIN 75 MG/1
75 CAPSULE ORAL 3 TIMES DAILY
Qty: 90 | Refills: 2 | Status: ACTIVE | COMMUNITY
Start: 2023-12-07 | End: 1900-01-01

## 2024-02-29 ENCOUNTER — APPOINTMENT (OUTPATIENT)
Dept: INTERNAL MEDICINE | Facility: CLINIC | Age: 34
End: 2024-02-29

## 2024-03-18 ENCOUNTER — APPOINTMENT (OUTPATIENT)
Dept: INTERNAL MEDICINE | Facility: CLINIC | Age: 34
End: 2024-03-18

## 2024-03-19 ENCOUNTER — APPOINTMENT (OUTPATIENT)
Dept: INTERNAL MEDICINE | Facility: CLINIC | Age: 34
End: 2024-03-19

## 2024-03-20 ENCOUNTER — APPOINTMENT (OUTPATIENT)
Dept: INTERNAL MEDICINE | Facility: CLINIC | Age: 34
End: 2024-03-20

## 2024-03-21 ENCOUNTER — APPOINTMENT (OUTPATIENT)
Dept: INTERNAL MEDICINE | Facility: CLINIC | Age: 34
End: 2024-03-21
Payer: MEDICAID

## 2024-03-21 VITALS
OXYGEN SATURATION: 97 % | BODY MASS INDEX: 37.38 KG/M2 | TEMPERATURE: 97.7 F | WEIGHT: 198 LBS | HEART RATE: 103 BPM | HEIGHT: 61 IN | SYSTOLIC BLOOD PRESSURE: 114 MMHG | DIASTOLIC BLOOD PRESSURE: 73 MMHG

## 2024-03-21 DIAGNOSIS — Z12.4 ENCOUNTER FOR SCREENING FOR MALIGNANT NEOPLASM OF CERVIX: ICD-10-CM

## 2024-03-21 DIAGNOSIS — F41.8 OTHER SPECIFIED ANXIETY DISORDERS: ICD-10-CM

## 2024-03-21 DIAGNOSIS — I89.0 LYMPHEDEMA, NOT ELSEWHERE CLASSIFIED: ICD-10-CM

## 2024-03-21 PROCEDURE — G2211 COMPLEX E/M VISIT ADD ON: CPT | Mod: NC,1L

## 2024-03-21 PROCEDURE — 99214 OFFICE O/P EST MOD 30 MIN: CPT

## 2024-03-21 RX ORDER — BUPRENORPHINE AND NALOXONE 8; 2 MG/1; MG/1
8-2 FILM BUCCAL; SUBLINGUAL
Qty: 30 | Refills: 0 | Status: DISCONTINUED | COMMUNITY
Start: 2022-11-01 | End: 2024-03-21

## 2024-03-21 RX ORDER — GABAPENTIN 600 MG/1
600 TABLET, COATED ORAL 3 TIMES DAILY
Qty: 90 | Refills: 2 | Status: DISCONTINUED | COMMUNITY
Start: 2024-02-06 | End: 2024-03-21

## 2024-03-21 RX ORDER — DICLOFENAC SODIUM 20 MG/G
2 SOLUTION TOPICAL
Qty: 1 | Refills: 0 | Status: DISCONTINUED | COMMUNITY
Start: 2023-07-12 | End: 2024-03-21

## 2024-03-21 RX ORDER — GABAPENTIN 600 MG/1
600 TABLET, COATED ORAL 3 TIMES DAILY
Qty: 90 | Refills: 2 | Status: DISCONTINUED | COMMUNITY
Start: 2023-11-02 | End: 2024-03-21

## 2024-03-21 RX ORDER — MELOXICAM 15 MG/1
15 TABLET ORAL DAILY
Qty: 30 | Refills: 0 | Status: DISCONTINUED | COMMUNITY
Start: 2024-02-06 | End: 2024-03-21

## 2024-03-21 RX ORDER — MELOXICAM 15 MG/1
15 TABLET ORAL DAILY
Qty: 30 | Refills: 2 | Status: DISCONTINUED | COMMUNITY
Start: 2023-06-20 | End: 2024-03-21

## 2024-03-21 RX ORDER — GABAPENTIN 300 MG/1
300 CAPSULE ORAL 3 TIMES DAILY
Qty: 90 | Refills: 3 | Status: DISCONTINUED | COMMUNITY
Start: 2022-11-01 | End: 2024-03-21

## 2024-03-21 NOTE — PHYSICAL EXAM
[No Acute Distress] : no acute distress [Well Nourished] : well nourished [Well-Appearing] : well-appearing [Well Developed] : well developed [PERRL] : pupils equal round and reactive to light [Normal Sclera/Conjunctiva] : normal sclera/conjunctiva [EOMI] : extraocular movements intact [Normal Outer Ear/Nose] : the outer ears and nose were normal in appearance [No JVD] : no jugular venous distention [Normal Oropharynx] : the oropharynx was normal [Supple] : supple [No Lymphadenopathy] : no lymphadenopathy [Thyroid Normal, No Nodules] : the thyroid was normal and there were no nodules present [No Respiratory Distress] : no respiratory distress  [No Accessory Muscle Use] : no accessory muscle use [Clear to Auscultation] : lungs were clear to auscultation bilaterally [Normal Rate] : normal rate  [Normal S1, S2] : normal S1 and S2 [Regular Rhythm] : with a regular rhythm [No Carotid Bruits] : no carotid bruits [No Murmur] : no murmur heard [No Varicosities] : no varicosities [No Abdominal Bruit] : a ~M bruit was not heard ~T in the abdomen [Pedal Pulses Present] : the pedal pulses are present [No Palpable Aorta] : no palpable aorta [No Extremity Clubbing/Cyanosis] : no extremity clubbing/cyanosis [Non Tender] : non-tender [Soft] : abdomen soft [No Masses] : no abdominal mass palpated [Non-distended] : non-distended [Normal Bowel Sounds] : normal bowel sounds [No HSM] : no HSM [Normal Posterior Cervical Nodes] : no posterior cervical lymphadenopathy [No CVA Tenderness] : no CVA  tenderness [Normal Anterior Cervical Nodes] : no anterior cervical lymphadenopathy [No Spinal Tenderness] : no spinal tenderness [No Joint Swelling] : no joint swelling [No Rash] : no rash [Grossly Normal Strength/Tone] : grossly normal strength/tone [Coordination Grossly Intact] : coordination grossly intact [No Focal Deficits] : no focal deficits [Normal Gait] : normal gait [Normal Affect] : the affect was normal [Deep Tendon Reflexes (DTR)] : deep tendon reflexes were 2+ and symmetric [Normal Insight/Judgement] : insight and judgment were intact

## 2024-03-22 ENCOUNTER — RX RENEWAL (OUTPATIENT)
Age: 34
End: 2024-03-22

## 2024-03-22 RX ORDER — HYDROXYZINE HYDROCHLORIDE 10 MG/1
10 TABLET ORAL
Qty: 30 | Refills: 0 | Status: ACTIVE | COMMUNITY
Start: 2024-02-06 | End: 1900-01-01

## 2024-04-02 ENCOUNTER — APPOINTMENT (OUTPATIENT)
Dept: OBGYN | Facility: CLINIC | Age: 34
End: 2024-04-02

## 2024-04-30 ENCOUNTER — APPOINTMENT (OUTPATIENT)
Dept: INTERNAL MEDICINE | Facility: CLINIC | Age: 34
End: 2024-04-30

## 2024-05-02 ENCOUNTER — TRANSCRIPTION ENCOUNTER (OUTPATIENT)
Age: 34
End: 2024-05-02

## 2024-05-02 ENCOUNTER — APPOINTMENT (OUTPATIENT)
Dept: INTERNAL MEDICINE | Facility: CLINIC | Age: 34
End: 2024-05-02
Payer: MEDICAID

## 2024-05-02 VITALS
DIASTOLIC BLOOD PRESSURE: 70 MMHG | TEMPERATURE: 98 F | BODY MASS INDEX: 37 KG/M2 | HEIGHT: 61 IN | HEART RATE: 77 BPM | SYSTOLIC BLOOD PRESSURE: 107 MMHG | OXYGEN SATURATION: 96 % | WEIGHT: 196 LBS

## 2024-05-02 DIAGNOSIS — M54.16 RADICULOPATHY, LUMBAR REGION: ICD-10-CM

## 2024-05-02 DIAGNOSIS — T22.00XA BURN OF UNSPECIFIED DEGREE OF SHOULDER AND UPPER LIMB, EXCEPT WRIST AND HAND, UNSPECIFIED SITE, INITIAL ENCOUNTER: ICD-10-CM

## 2024-05-02 PROCEDURE — G0444 DEPRESSION SCREEN ANNUAL: CPT | Mod: 59

## 2024-05-02 PROCEDURE — 99214 OFFICE O/P EST MOD 30 MIN: CPT | Mod: GC,25

## 2024-05-02 PROCEDURE — G2211 COMPLEX E/M VISIT ADD ON: CPT | Mod: NC,1L

## 2024-05-02 RX ORDER — HYDROXYZINE HYDROCHLORIDE 10 MG/1
10 TABLET ORAL
Qty: 60 | Refills: 3 | Status: ACTIVE | COMMUNITY
Start: 2022-11-01 | End: 1900-01-01

## 2024-05-02 NOTE — REVIEW OF SYSTEMS
[Anxiety] : anxiety [Depression] : depression [Negative] : Heme/Lymph [FreeTextEntry9] : Chronic swelling in her ankles

## 2024-05-02 NOTE — ASSESSMENT
[FreeTextEntry1] : Ms. Moore is a 34 y/o F with a PMHx of oxycodone abuse (in remission), MARLY/MDD, bilateral LE lymphedema, sciatica, and chronic lower back and bilateral knee pain presenting to the clinic for follow up.
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
## 2024-05-02 NOTE — HEALTH RISK ASSESSMENT
[0] : 2) Feeling down, depressed, or hopeless: Not at all (0) [PHQ-2 Negative - No further assessment needed] : PHQ-2 Negative - No further assessment needed [de-identified] : >5 minutes spent  [[id number]] : 0

## 2024-05-02 NOTE — END OF VISIT
[] : Resident [FreeTextEntry3] : 33F w/hx of opioid abuse, anxiety/depression, L3 impingement, sciatica, lymphedema her for followup.  At last visit, we changed pain meds to pregabalin (as gabapentin can worsen lymphedema) per Dr. Macias's recs, pt did try it, however pain not relieved. States Gabapentin was working better. Sent 30 day supply of Gabapentin 600mg TID, and emailed [facility] to f/u with lymphedema specialist, Dr. Adela Voss. Goal is to get lymphedema under control for EMG per last notes.   [Time Spent: ___ minutes] : I have spent [unfilled] minutes of time on the encounter.

## 2024-05-02 NOTE — PHYSICAL EXAM
[No Acute Distress] : no acute distress [Well Nourished] : well nourished [Well Developed] : well developed [Well-Appearing] : well-appearing [Normal Sclera/Conjunctiva] : normal sclera/conjunctiva [PERRL] : pupils equal round and reactive to light [EOMI] : extraocular movements intact [Normal Outer Ear/Nose] : the outer ears and nose were normal in appearance [Normal Oropharynx] : the oropharynx was normal [No JVD] : no jugular venous distention [No Lymphadenopathy] : no lymphadenopathy [Supple] : supple [Thyroid Normal, No Nodules] : the thyroid was normal and there were no nodules present [No Respiratory Distress] : no respiratory distress  [No Accessory Muscle Use] : no accessory muscle use [Clear to Auscultation] : lungs were clear to auscultation bilaterally [Normal Rate] : normal rate  [Regular Rhythm] : with a regular rhythm [Normal S1, S2] : normal S1 and S2 [No Murmur] : no murmur heard [No Carotid Bruits] : no carotid bruits [No Abdominal Bruit] : a ~M bruit was not heard ~T in the abdomen [No Varicosities] : no varicosities [Pedal Pulses Present] : the pedal pulses are present [No Edema] : there was no peripheral edema [No Palpable Aorta] : no palpable aorta [No Extremity Clubbing/Cyanosis] : no extremity clubbing/cyanosis [Soft] : abdomen soft [Non Tender] : non-tender [Non-distended] : non-distended [No Masses] : no abdominal mass palpated [No HSM] : no HSM [Normal Bowel Sounds] : normal bowel sounds [Normal Posterior Cervical Nodes] : no posterior cervical lymphadenopathy [Normal Anterior Cervical Nodes] : no anterior cervical lymphadenopathy [No CVA Tenderness] : no CVA  tenderness [No Spinal Tenderness] : no spinal tenderness [No Joint Swelling] : no joint swelling [Grossly Normal Strength/Tone] : grossly normal strength/tone [No Rash] : no rash [Coordination Grossly Intact] : coordination grossly intact [No Focal Deficits] : no focal deficits [Normal Gait] : normal gait [Deep Tendon Reflexes (DTR)] : deep tendon reflexes were 2+ and symmetric [Normal Affect] : the affect was normal [Normal Insight/Judgement] : insight and judgment were intact [de-identified] : + Blotchy hyperpigmented lesions over forearms b/l  + 2 nonpitting edema in b/l lower extremities

## 2024-05-02 NOTE — HISTORY OF PRESENT ILLNESS
[FreeTextEntry1] : referrals renewal doc note for management follow up  [de-identified] : Ms. Moore is a 34 y/o F with a PMHx of oxycodone abuse (in remission), MARLY/MDD, bilateral LE lymphedema, sciatica, and chronic lower back and bilateral knee pain presenting to the clinic for follow up.  Pt was last seen at our practice on 3/21/24. At this visit, pt was being managed for her chronic lower extremity edema and her depression/anxiety. Pt was previously scheduled for appointment with Dr. Voss for evaluation of lymphedema but was unable to attend. She has been seeing [facility] and is requesting another referral to see another provider.   Regarding pt's chronic pains, pt reports to have refractory pains after having discontinued Gabapentin with Dr. Macias. Pt states "I was told the Gabapentin can cause worsening swelling, but it helped my symptoms". Pt further adds she had been taking Gabapentin for years prior and did not notice a substantial increase in her swelling.   Pt requests refills on her Hydroxyzine 10mg BID PRN and to restart Gabapentin. Pt also requests letter for her building (one was previously made last year to ensure she has elevator access), as well as referrals for [facility], Dermatology, and Behavioral health. Denies fevers, chills, n/v/d, CP, SOB.

## 2024-05-08 ENCOUNTER — TRANSCRIPTION ENCOUNTER (OUTPATIENT)
Age: 34
End: 2024-05-08

## 2024-05-08 ENCOUNTER — APPOINTMENT (OUTPATIENT)
Dept: INTERNAL MEDICINE | Facility: CLINIC | Age: 34
End: 2024-05-08

## 2024-05-16 ENCOUNTER — APPOINTMENT (OUTPATIENT)
Dept: DERMATOLOGY | Facility: CLINIC | Age: 34
End: 2024-05-16

## 2024-05-30 ENCOUNTER — APPOINTMENT (OUTPATIENT)
Dept: INTERNAL MEDICINE | Facility: CLINIC | Age: 34
End: 2024-05-30

## 2024-06-11 ENCOUNTER — APPOINTMENT (OUTPATIENT)
Dept: INTERNAL MEDICINE | Facility: CLINIC | Age: 34
End: 2024-06-11

## 2024-06-11 RX ORDER — ESCITALOPRAM OXALATE 10 MG/1
10 TABLET ORAL DAILY
Qty: 45 | Refills: 0 | Status: ACTIVE | COMMUNITY
Start: 2022-11-01 | End: 1900-01-01

## 2024-06-11 RX ORDER — GABAPENTIN 600 MG/1
600 TABLET, COATED ORAL 3 TIMES DAILY
Qty: 90 | Refills: 3 | Status: ACTIVE | COMMUNITY
Start: 2024-05-02 | End: 1900-01-01

## 2024-07-09 ENCOUNTER — APPOINTMENT (OUTPATIENT)
Dept: PHYSICAL MEDICINE AND REHAB | Facility: CLINIC | Age: 34
End: 2024-07-09

## 2024-07-17 ENCOUNTER — APPOINTMENT (OUTPATIENT)
Dept: INTERNAL MEDICINE | Facility: CLINIC | Age: 34
End: 2024-07-17

## 2024-09-05 ENCOUNTER — APPOINTMENT (OUTPATIENT)
Dept: INTERNAL MEDICINE | Facility: CLINIC | Age: 34
End: 2024-09-05
Payer: MEDICAID

## 2024-09-05 VITALS
TEMPERATURE: 97.9 F | HEIGHT: 61 IN | HEART RATE: 72 BPM | DIASTOLIC BLOOD PRESSURE: 76 MMHG | SYSTOLIC BLOOD PRESSURE: 115 MMHG | OXYGEN SATURATION: 100 % | BODY MASS INDEX: 37.76 KG/M2 | WEIGHT: 200 LBS

## 2024-09-05 DIAGNOSIS — F19.10 OTHER PSYCHOACTIVE SUBSTANCE ABUSE, UNCOMPLICATED: ICD-10-CM

## 2024-09-05 DIAGNOSIS — F11.91 OPIOID USE, UNSPECIFIED, IN REMISSION: ICD-10-CM

## 2024-09-05 DIAGNOSIS — Z13.220 ENCOUNTER FOR SCREENING FOR LIPOID DISORDERS: ICD-10-CM

## 2024-09-05 DIAGNOSIS — Z00.01 ENCOUNTER FOR GENERAL ADULT MEDICAL EXAMINATION WITH ABNORMAL FINDINGS: ICD-10-CM

## 2024-09-05 DIAGNOSIS — M54.16 RADICULOPATHY, LUMBAR REGION: ICD-10-CM

## 2024-09-05 DIAGNOSIS — F41.8 OTHER SPECIFIED ANXIETY DISORDERS: ICD-10-CM

## 2024-09-05 DIAGNOSIS — Z00.00 ENCOUNTER FOR GENERAL ADULT MEDICAL EXAMINATION W/OUT ABNORMAL FINDINGS: ICD-10-CM

## 2024-09-05 DIAGNOSIS — R26.9 UNSPECIFIED ABNORMALITIES OF GAIT AND MOBILITY: ICD-10-CM

## 2024-09-05 PROCEDURE — 99213 OFFICE O/P EST LOW 20 MIN: CPT | Mod: 25,GC

## 2024-09-05 PROCEDURE — 99395 PREV VISIT EST AGE 18-39: CPT | Mod: 25

## 2024-09-05 RX ORDER — BUPRENORPHINE HYDROCHLORIDE 8 MG/1
8 TABLET SUBLINGUAL
Refills: 0 | Status: ACTIVE | COMMUNITY
Start: 2024-09-05

## 2024-09-05 RX ORDER — GABAPENTIN 600 MG/1
600 TABLET ORAL 3 TIMES DAILY
Refills: 0 | Status: ACTIVE | COMMUNITY
Start: 2024-09-05

## 2024-09-09 LAB
ALBUMIN SERPL ELPH-MCNC: 4.1 G/DL
ALP BLD-CCNC: 101 U/L
ALT SERPL-CCNC: 16 U/L
ANION GAP SERPL CALC-SCNC: 10 MMOL/L
AST SERPL-CCNC: 15 U/L
BILIRUB SERPL-MCNC: 0.2 MG/DL
BUN SERPL-MCNC: 11 MG/DL
CALCIUM SERPL-MCNC: 9.3 MG/DL
CHLORIDE SERPL-SCNC: 102 MMOL/L
CHOLEST SERPL-MCNC: 128 MG/DL
CO2 SERPL-SCNC: 28 MMOL/L
CREAT SERPL-MCNC: 0.67 MG/DL
EGFR: 117 ML/MIN/1.73M2
ESTIMATED AVERAGE GLUCOSE: 114 MG/DL
GLUCOSE SERPL-MCNC: 83 MG/DL
HBA1C MFR BLD HPLC: 5.6 %
HDLC SERPL-MCNC: 40 MG/DL
LDLC SERPL CALC-MCNC: 74 MG/DL
NONHDLC SERPL-MCNC: 88 MG/DL
POTASSIUM SERPL-SCNC: 4.4 MMOL/L
PROT SERPL-MCNC: 6.8 G/DL
SODIUM SERPL-SCNC: 140 MMOL/L
TRIGL SERPL-MCNC: 69 MG/DL

## 2024-09-09 NOTE — REVIEW OF SYSTEMS
[Joint Pain] : joint pain [Back Pain] : back pain [Fever] : no fever [Chills] : no chills [Fatigue] : no fatigue [Night Sweats] : no night sweats [Vision Problems] : no vision problems [Hearing Loss] : no hearing loss [Nasal Discharge] : no nasal discharge [Sore Throat] : no sore throat [Chest Pain] : no chest pain [Leg Claudication] : no leg claudication [Lower Ext Edema] : no lower extremity edema [Orthopnea] : no orthopnea [Shortness Of Breath] : no shortness of breath [Cough] : no cough [Abdominal Pain] : no abdominal pain [Nausea] : no nausea [Vomiting] : no vomiting [Heartburn] : no heartburn [Hematuria] : no hematuria [Frequency] : no frequency [Vaginal Discharge] : no vaginal discharge [Skin Rash] : no skin rash [Headache] : no headache [Dizziness] : no dizziness

## 2024-09-09 NOTE — END OF VISIT
[] : Resident [FreeTextEntry3] : 33-year-old female presenting for complete physical exam.  Has a history of lumbar radiculopathy which limits her mobility, ambulates with a cane. Has active [employer] transportation.  Pain has been present for the last 2 years, over the same.  She is also had significant lower extremity swelling.  She reports that last year at [facility] she had lower extremity Dopplers which were negative for DVT.  She had been seeing Dr. Macias in [facility] and was referred to an edema specialist, has not seen that person.  On exam, well-appearing, standing with a cane at her side, leaning forward due to pain with spine extension.  Will obtain labs today, renewals provided.  Referred to [facility], if unable to connect with edema specialist would consider vascular referral.  No dyspnea orthopnea chest pain or palpitations to suggest heart failure at this point but could consider echo in the future if needed.  Return to clinic in 6 weeks with Dr. Ellis or earlier as needed.

## 2024-09-09 NOTE — PHYSICAL EXAM
[Well Nourished] : well nourished [EOMI] : extraocular movements intact [No JVD] : no jugular venous distention [No Lymphadenopathy] : no lymphadenopathy [Clear to Auscultation] : lungs were clear to auscultation bilaterally [Regular Rhythm] : with a regular rhythm [Normal S1, S2] : normal S1 and S2 [Pedal Pulses Present] : the pedal pulses are present [No Edema] : there was no peripheral edema [Soft] : abdomen soft [Non Tender] : non-tender [Non-distended] : non-distended [No HSM] : no HSM [No CVA Tenderness] : no CVA  tenderness [No Focal Deficits] : no focal deficits [Speech Grossly Normal] : speech grossly normal [Alert and Oriented x3] : oriented to person, place, and time [Normal Insight/Judgement] : insight and judgment were intact [Thyroid Normal, No Nodules] : the thyroid was normal and there were no nodules present [Normal Appearance] : normal in appearance [No Masses] : no palpable masses [No Nipple Discharge] : no nipple discharge [No Axillary Lymphadenopathy] : no axillary lymphadenopathy [de-identified] : Hunched back, uses cane [de-identified] : Mid to low back spinal tenderness, SLR+ [de-identified] : Circumference 2 cm above medial malleolus- RLE- 13cm, LLE- 11cm, 10cm below pattela- RLE-18CM, LLE- 18CM

## 2024-09-09 NOTE — PHYSICAL EXAM
[Well Nourished] : well nourished [EOMI] : extraocular movements intact [No JVD] : no jugular venous distention [No Lymphadenopathy] : no lymphadenopathy [Clear to Auscultation] : lungs were clear to auscultation bilaterally [Regular Rhythm] : with a regular rhythm [Normal S1, S2] : normal S1 and S2 [Pedal Pulses Present] : the pedal pulses are present [No Edema] : there was no peripheral edema [Soft] : abdomen soft [Non Tender] : non-tender [Non-distended] : non-distended [No HSM] : no HSM [No CVA Tenderness] : no CVA  tenderness [No Focal Deficits] : no focal deficits [Speech Grossly Normal] : speech grossly normal [Alert and Oriented x3] : oriented to person, place, and time [Normal Insight/Judgement] : insight and judgment were intact [Thyroid Normal, No Nodules] : the thyroid was normal and there were no nodules present [Normal Appearance] : normal in appearance [No Masses] : no palpable masses [No Nipple Discharge] : no nipple discharge [No Axillary Lymphadenopathy] : no axillary lymphadenopathy [de-identified] : Hunched back, uses cane [de-identified] : Mid to low back spinal tenderness, SLR+ [de-identified] : Circumference 2 cm above medial malleolus- RLE- 13cm, LLE- 11cm, 10cm below pattela- RLE-18CM, LLE- 18CM

## 2024-09-09 NOTE — HISTORY OF PRESENT ILLNESS
[FreeTextEntry1] : Pt is here for yearly physical examination. [de-identified] : 32 yo F with PMHx of OUD (on suboxone 8 mg qd), anxiety/depression (lexapro 15 mg qd and hydroxyzine 10 mg qd), Back pain (2022,  MRI spine: L4 mild disc herniation c/b bilateral sciatica advised  transforaminal epidural steroid injection, never recieved, previously on gabapentin d/cd iso LE edema, restarted on gabapentin last PCP visit since no change in LE edema), neuropathic pain, b/l knees medial meniscus tear w/chondral loss  (meloxicam 15 mg qd and topical diclofenac).   9/5/2024 C/o LBA radiating to knee and ball of foot (L>R), started after epidural injection for C-sec in 2022 wt loss 2/2 walking, decreased appetite, off depression meds since 1 week (out of refills) Missed  appointments this year Lymphedema- US LLE negative for DVT in 2023 at [facility]  ADL: Uses cane SHx-unemployed, lives with spouse and 2 kids, Current smoker (1/2 pack per day now), smoking since 16years. denies recreational drug use, last drank socially in 2001. FHx- Father-DM Last pap smear- 2022- Normal vaccination-  UTD on COVID and TdaP sexual history- not sexually active, contraception- mirena IUD

## 2024-09-09 NOTE — HISTORY OF PRESENT ILLNESS
[FreeTextEntry1] : Pt is here for yearly physical examination. [de-identified] : 34 yo F with PMHx of OUD (on suboxone 8 mg qd), anxiety/depression (lexapro 15 mg qd and hydroxyzine 10 mg qd), Back pain (2022,  MRI spine: L4 mild disc herniation c/b bilateral sciatica advised  transforaminal epidural steroid injection, never recieved, previously on gabapentin d/cd iso LE edema, restarted on gabapentin last PCP visit since no change in LE edema), neuropathic pain, b/l knees medial meniscus tear w/chondral loss  (meloxicam 15 mg qd and topical diclofenac).   9/5/2024 C/o LBA radiating to knee and ball of foot (L>R), started after epidural injection for C-sec in 2022 wt loss 2/2 walking, decreased appetite, off depression meds since 1 week (out of refills) Missed  appointments this year Lymphedema- US LLE negative for DVT in 2023 at [facility]  ADL: Uses cane SHx-unemployed, lives with spouse and 2 kids, Current smoker (1/2 pack per day now), smoking since 16years. denies recreational drug use, last drank socially in 2001. FHx- Father-DM Last pap smear- 2022- Normal vaccination-  UTD on COVID and TdaP sexual history- not sexually active, contraception- mirena IUD

## 2024-09-09 NOTE — HEALTH RISK ASSESSMENT
[Fair] :  ~his/her~ mood as fair [No] : No [Two or more falls in past year] : Patient reported two or more falls in the past year [3] : 2) Feeling down, depressed, or hopeless for nearly every day (3) [1/2 of Days or More (2)] : 4.) Feeling tired or having little energy? Half the days or more [Nearly Every Day (3)] : 5.) Poor appetite or overeating? Nearly every day [Several Days (1)] : 7.) Trouble concentrating on things, such as reading a newspaper or watching television? Several days [Not at All (0)] : 9.) Thoughts that you would be off dead or of hurting yourself in some way? Not at all [Moderately Severe] : Severity of Depression is Moderately Severe [Current] : Current [5-9] : 5-9 [NO] : No [Patient reported PAP Smear was normal] : Patient reported PAP Smear was normal [With Significant Other] : lives with significant other [With Family] : lives with family [Unemployed] : unemployed [# Of Children ___] : has [unfilled] children [] :  [Feels Safe at Home] : Feels safe at home [de-identified] : 3 falls in last year, most likely mechanical [[id number]] : 16 [PapSmearDate] : 2022

## 2024-09-09 NOTE — END OF VISIT
[] : Resident [FreeTextEntry3] : 33-year-old female presenting for complete physical exam.  Has a history of lumbar radiculopathy which limits her mobility, ambulates with a cane. Has active [employer] transportation.  Pain has been present for the last 2 years, over the same.  She is also had significant lower extremity swelling.  She reports that last year at [facility] she had lower extremity Dopplers which were negative for DVT.  She had been seeing Dr. Macias in [facility] and was referred to an edema specialist, has not seen that person.  On exam, well-appearing, standing with a cane at her side, leaning forward due to pain with spine extension.  Will obtain labs today, renewals provided.  Referred to [facility], if unable to connect with edema specialist would consider vascular referral.  No dyspnea orthopnea chest pain or palpitations to suggest heart failure at this point but could consider echo in the future if needed.  Return to clinic in 6 weeks with Dr. Ellis or earlier as needed.

## 2024-09-09 NOTE — HEALTH RISK ASSESSMENT
[Fair] :  ~his/her~ mood as fair [No] : No [Two or more falls in past year] : Patient reported two or more falls in the past year [3] : 2) Feeling down, depressed, or hopeless for nearly every day (3) [1/2 of Days or More (2)] : 4.) Feeling tired or having little energy? Half the days or more [Nearly Every Day (3)] : 5.) Poor appetite or overeating? Nearly every day [Several Days (1)] : 7.) Trouble concentrating on things, such as reading a newspaper or watching television? Several days [Not at All (0)] : 9.) Thoughts that you would be off dead or of hurting yourself in some way? Not at all [Moderately Severe] : Severity of Depression is Moderately Severe [Current] : Current [5-9] : 5-9 [NO] : No [Patient reported PAP Smear was normal] : Patient reported PAP Smear was normal [With Significant Other] : lives with significant other [With Family] : lives with family [Unemployed] : unemployed [# Of Children ___] : has [unfilled] children [] :  [Feels Safe at Home] : Feels safe at home [de-identified] : 3 falls in last year, most likely mechanical [[id number]] : 16 [PapSmearDate] : 2022

## 2024-10-30 ENCOUNTER — APPOINTMENT (OUTPATIENT)
Dept: INTERNAL MEDICINE | Facility: CLINIC | Age: 34
End: 2024-10-30

## 2024-11-13 ENCOUNTER — APPOINTMENT (OUTPATIENT)
Dept: INTERNAL MEDICINE | Facility: CLINIC | Age: 34
End: 2024-11-13
Payer: MEDICAID

## 2024-11-13 VITALS
OXYGEN SATURATION: 98 % | TEMPERATURE: 98.7 F | SYSTOLIC BLOOD PRESSURE: 121 MMHG | DIASTOLIC BLOOD PRESSURE: 79 MMHG | RESPIRATION RATE: 16 BRPM | HEART RATE: 97 BPM

## 2024-11-13 DIAGNOSIS — M54.16 RADICULOPATHY, LUMBAR REGION: ICD-10-CM

## 2024-11-13 DIAGNOSIS — F41.8 OTHER SPECIFIED ANXIETY DISORDERS: ICD-10-CM

## 2024-11-13 DIAGNOSIS — Z23 ENCOUNTER FOR IMMUNIZATION: ICD-10-CM

## 2024-11-13 DIAGNOSIS — F11.91 OPIOID USE, UNSPECIFIED, IN REMISSION: ICD-10-CM

## 2024-11-13 PROCEDURE — G2211 COMPLEX E/M VISIT ADD ON: CPT | Mod: NC

## 2024-11-13 PROCEDURE — 99215 OFFICE O/P EST HI 40 MIN: CPT

## 2024-11-13 PROCEDURE — 90656 IIV3 VACC NO PRSV 0.5 ML IM: CPT

## 2024-11-13 PROCEDURE — G0008: CPT

## 2024-11-21 PROBLEM — Z23 ENCOUNTER FOR IMMUNIZATION: Status: ACTIVE | Noted: 2024-11-13 | Resolved: 2024-11-27

## 2025-03-27 ENCOUNTER — RX RENEWAL (OUTPATIENT)
Age: 35
End: 2025-03-27

## 2025-04-24 ENCOUNTER — RX RENEWAL (OUTPATIENT)
Age: 35
End: 2025-04-24

## 2025-09-16 ENCOUNTER — APPOINTMENT (OUTPATIENT)
Dept: INTERNAL MEDICINE | Facility: CLINIC | Age: 35
End: 2025-09-16